# Patient Record
Sex: MALE | Race: BLACK OR AFRICAN AMERICAN | NOT HISPANIC OR LATINO | Employment: UNEMPLOYED | URBAN - NONMETROPOLITAN AREA
[De-identification: names, ages, dates, MRNs, and addresses within clinical notes are randomized per-mention and may not be internally consistent; named-entity substitution may affect disease eponyms.]

---

## 2018-08-04 ENCOUNTER — HOSPITAL ENCOUNTER (OUTPATIENT)
Facility: HOSPITAL | Age: 28
Setting detail: OBSERVATION
Discharge: HOME | End: 2018-08-05
Attending: EMERGENCY MEDICINE | Admitting: STUDENT IN AN ORGANIZED HEALTH CARE EDUCATION/TRAINING PROGRAM

## 2018-08-04 ENCOUNTER — APPOINTMENT (OUTPATIENT)
Dept: CT IMAGING | Facility: HOSPITAL | Age: 28
End: 2018-08-04

## 2018-08-04 DIAGNOSIS — M62.82 NON-TRAUMATIC RHABDOMYOLYSIS: Primary | ICD-10-CM

## 2018-08-04 PROBLEM — E86.0 DEHYDRATION: Status: ACTIVE | Noted: 2018-08-04

## 2018-08-04 PROBLEM — F17.210 DEPENDENCE ON NICOTINE FROM CIGARETTES: Status: ACTIVE | Noted: 2018-08-04

## 2018-08-04 LAB
ACETONE BLD QL: NEGATIVE
ALBUMIN SERPL-MCNC: 4.4 G/DL (ref 3.4–4.8)
ALBUMIN/GLOB SERPL: 1.5 G/DL (ref 1.1–1.8)
ALP SERPL-CCNC: 59 U/L (ref 38–126)
ALT SERPL W P-5'-P-CCNC: 39 U/L (ref 21–72)
ANION GAP SERPL CALCULATED.3IONS-SCNC: 7 MMOL/L (ref 5–15)
AST SERPL-CCNC: 68 U/L (ref 17–59)
BACTERIA UR QL AUTO: ABNORMAL /HPF
BASOPHILS # BLD AUTO: 0.02 10*3/MM3 (ref 0–0.2)
BASOPHILS NFR BLD AUTO: 0.2 % (ref 0–2)
BILIRUB SERPL-MCNC: 2.2 MG/DL (ref 0.2–1.3)
BILIRUB UR QL STRIP: ABNORMAL
BUN BLD-MCNC: 15 MG/DL (ref 7–21)
BUN/CREAT SERPL: 17.6 (ref 7–25)
CALCIUM SPEC-SCNC: 8.8 MG/DL (ref 8.4–10.2)
CHLORIDE SERPL-SCNC: 107 MMOL/L (ref 95–110)
CK MB SERPL-CCNC: 2.43 NG/ML (ref 0–5)
CK SERPL-CCNC: 1003 U/L (ref 55–170)
CK SERPL-CCNC: 837 U/L (ref 55–170)
CLARITY UR: CLEAR
CO2 SERPL-SCNC: 25 MMOL/L (ref 22–31)
COLOR UR: YELLOW
CREAT BLD-MCNC: 0.85 MG/DL (ref 0.7–1.3)
DEPRECATED RDW RBC AUTO: 41.2 FL (ref 35.1–43.9)
EOSINOPHIL # BLD AUTO: 0.13 10*3/MM3 (ref 0–0.7)
EOSINOPHIL NFR BLD AUTO: 1.4 % (ref 0–7)
ERYTHROCYTE [DISTWIDTH] IN BLOOD BY AUTOMATED COUNT: 12.3 % (ref 11.5–14.5)
GFR SERPL CREATININE-BSD FRML MDRD: 128 ML/MIN/1.73 (ref 70–162)
GLOBULIN UR ELPH-MCNC: 2.9 GM/DL (ref 2.3–3.5)
GLUCOSE BLD-MCNC: 123 MG/DL (ref 60–100)
GLUCOSE BLDC GLUCOMTR-MCNC: 133 MG/DL (ref 70–130)
GLUCOSE UR STRIP-MCNC: NEGATIVE MG/DL
HCT VFR BLD AUTO: 40.3 % (ref 39–49)
HGB BLD-MCNC: 14.2 G/DL (ref 13.7–17.3)
HGB UR QL STRIP.AUTO: NEGATIVE
HYALINE CASTS UR QL AUTO: ABNORMAL /LPF
IMM GRANULOCYTES # BLD: 0.02 10*3/MM3 (ref 0–0.02)
IMM GRANULOCYTES NFR BLD: 0.2 % (ref 0–0.5)
KETONES UR QL STRIP: NEGATIVE
LEUKOCYTE ESTERASE UR QL STRIP.AUTO: NEGATIVE
LIPASE SERPL-CCNC: 296 U/L (ref 23–300)
LYMPHOCYTES # BLD AUTO: 2.26 10*3/MM3 (ref 0.6–4.2)
LYMPHOCYTES NFR BLD AUTO: 24 % (ref 10–50)
MAGNESIUM SERPL-MCNC: 2.3 MG/DL (ref 1.6–2.3)
MCH RBC QN AUTO: 32.1 PG (ref 26.5–34)
MCHC RBC AUTO-ENTMCNC: 35.2 G/DL (ref 31.5–36.3)
MCV RBC AUTO: 91.2 FL (ref 80–98)
MONOCYTES # BLD AUTO: 0.8 10*3/MM3 (ref 0–0.9)
MONOCYTES NFR BLD AUTO: 8.5 % (ref 0–12)
NEUTROPHILS # BLD AUTO: 6.2 10*3/MM3 (ref 2–8.6)
NEUTROPHILS NFR BLD AUTO: 65.7 % (ref 37–80)
NITRITE UR QL STRIP: NEGATIVE
PH UR STRIP.AUTO: 6.5 [PH] (ref 5–9)
PLATELET # BLD AUTO: 205 10*3/MM3 (ref 150–450)
PMV BLD AUTO: 9.9 FL (ref 8–12)
POTASSIUM BLD-SCNC: 3.7 MMOL/L (ref 3.5–5.1)
PROT SERPL-MCNC: 7.3 G/DL (ref 6.3–8.6)
PROT UR QL STRIP: ABNORMAL
RBC # BLD AUTO: 4.42 10*6/MM3 (ref 4.37–5.74)
RBC # UR: ABNORMAL /HPF
REF LAB TEST METHOD: ABNORMAL
SODIUM BLD-SCNC: 139 MMOL/L (ref 137–145)
SP GR UR STRIP: 1.03 (ref 1–1.03)
SQUAMOUS #/AREA URNS HPF: ABNORMAL /HPF
UROBILINOGEN UR QL STRIP: ABNORMAL
WBC NRBC COR # BLD: 9.43 10*3/MM3 (ref 3.2–9.8)
WBC UR QL AUTO: ABNORMAL /HPF

## 2018-08-04 PROCEDURE — G0378 HOSPITAL OBSERVATION PER HR: HCPCS

## 2018-08-04 PROCEDURE — 85025 COMPLETE CBC W/AUTO DIFF WBC: CPT | Performed by: PHYSICIAN ASSISTANT

## 2018-08-04 PROCEDURE — 81001 URINALYSIS AUTO W/SCOPE: CPT | Performed by: PHYSICIAN ASSISTANT

## 2018-08-04 PROCEDURE — 83690 ASSAY OF LIPASE: CPT | Performed by: PHYSICIAN ASSISTANT

## 2018-08-04 PROCEDURE — 70450 CT HEAD/BRAIN W/O DYE: CPT

## 2018-08-04 PROCEDURE — 83735 ASSAY OF MAGNESIUM: CPT | Performed by: PHYSICIAN ASSISTANT

## 2018-08-04 PROCEDURE — 96361 HYDRATE IV INFUSION ADD-ON: CPT

## 2018-08-04 PROCEDURE — 80053 COMPREHEN METABOLIC PANEL: CPT | Performed by: PHYSICIAN ASSISTANT

## 2018-08-04 PROCEDURE — 25010000002 ONDANSETRON PER 1 MG: Performed by: PHYSICIAN ASSISTANT

## 2018-08-04 PROCEDURE — 82009 KETONE BODYS QUAL: CPT | Performed by: PHYSICIAN ASSISTANT

## 2018-08-04 PROCEDURE — 99284 EMERGENCY DEPT VISIT MOD MDM: CPT

## 2018-08-04 PROCEDURE — 93005 ELECTROCARDIOGRAM TRACING: CPT | Performed by: PHYSICIAN ASSISTANT

## 2018-08-04 PROCEDURE — 82962 GLUCOSE BLOOD TEST: CPT

## 2018-08-04 PROCEDURE — 82553 CREATINE MB FRACTION: CPT | Performed by: PHYSICIAN ASSISTANT

## 2018-08-04 PROCEDURE — 82550 ASSAY OF CK (CPK): CPT | Performed by: STUDENT IN AN ORGANIZED HEALTH CARE EDUCATION/TRAINING PROGRAM

## 2018-08-04 PROCEDURE — 93010 ELECTROCARDIOGRAM REPORT: CPT | Performed by: INTERNAL MEDICINE

## 2018-08-04 PROCEDURE — 82550 ASSAY OF CK (CPK): CPT | Performed by: PHYSICIAN ASSISTANT

## 2018-08-04 PROCEDURE — 93005 ELECTROCARDIOGRAM TRACING: CPT | Performed by: EMERGENCY MEDICINE

## 2018-08-04 PROCEDURE — 96374 THER/PROPH/DIAG INJ IV PUSH: CPT

## 2018-08-04 RX ORDER — SODIUM CHLORIDE 0.9 % (FLUSH) 0.9 %
1-10 SYRINGE (ML) INJECTION AS NEEDED
Status: DISCONTINUED | OUTPATIENT
Start: 2018-08-04 | End: 2018-08-05 | Stop reason: HOSPADM

## 2018-08-04 RX ORDER — ONDANSETRON 2 MG/ML
4 INJECTION INTRAMUSCULAR; INTRAVENOUS EVERY 6 HOURS PRN
Status: DISCONTINUED | OUTPATIENT
Start: 2018-08-04 | End: 2018-08-05 | Stop reason: HOSPADM

## 2018-08-04 RX ORDER — NICOTINE 21 MG/24HR
1 PATCH, TRANSDERMAL 24 HOURS TRANSDERMAL EVERY 24 HOURS
Status: DISCONTINUED | OUTPATIENT
Start: 2018-08-04 | End: 2018-08-05 | Stop reason: HOSPADM

## 2018-08-04 RX ORDER — HYDROXYZINE PAMOATE 25 MG/1
50 CAPSULE ORAL 3 TIMES DAILY PRN
Status: DISCONTINUED | OUTPATIENT
Start: 2018-08-04 | End: 2018-08-05 | Stop reason: HOSPADM

## 2018-08-04 RX ORDER — ONDANSETRON 2 MG/ML
4 INJECTION INTRAMUSCULAR; INTRAVENOUS ONCE
Status: COMPLETED | OUTPATIENT
Start: 2018-08-04 | End: 2018-08-04

## 2018-08-04 RX ORDER — SODIUM CHLORIDE 0.9 % (FLUSH) 0.9 %
10 SYRINGE (ML) INJECTION AS NEEDED
Status: DISCONTINUED | OUTPATIENT
Start: 2018-08-04 | End: 2018-08-05 | Stop reason: HOSPADM

## 2018-08-04 RX ORDER — SODIUM CHLORIDE 9 MG/ML
200 INJECTION, SOLUTION INTRAVENOUS CONTINUOUS
Status: DISCONTINUED | OUTPATIENT
Start: 2018-08-04 | End: 2018-08-05 | Stop reason: HOSPADM

## 2018-08-04 RX ORDER — ACETAMINOPHEN 325 MG/1
650 TABLET ORAL EVERY 4 HOURS PRN
Status: DISCONTINUED | OUTPATIENT
Start: 2018-08-04 | End: 2018-08-05 | Stop reason: HOSPADM

## 2018-08-04 RX ADMIN — SODIUM CHLORIDE 1000 ML: 9 INJECTION, SOLUTION INTRAVENOUS at 12:38

## 2018-08-04 RX ADMIN — SODIUM CHLORIDE 1000 ML: 9 INJECTION, SOLUTION INTRAVENOUS at 14:50

## 2018-08-04 RX ADMIN — ONDANSETRON HYDROCHLORIDE 4 MG: 2 INJECTION, SOLUTION INTRAMUSCULAR; INTRAVENOUS at 12:38

## 2018-08-04 RX ADMIN — SODIUM CHLORIDE 200 ML/HR: 9 INJECTION, SOLUTION INTRAVENOUS at 19:00

## 2018-08-05 VITALS
SYSTOLIC BLOOD PRESSURE: 149 MMHG | BODY MASS INDEX: 25.45 KG/M2 | TEMPERATURE: 97.5 F | OXYGEN SATURATION: 99 % | WEIGHT: 220 LBS | HEIGHT: 78 IN | RESPIRATION RATE: 18 BRPM | HEART RATE: 61 BPM | DIASTOLIC BLOOD PRESSURE: 68 MMHG

## 2018-08-05 PROBLEM — M62.82 NON-TRAUMATIC RHABDOMYOLYSIS: Status: RESOLVED | Noted: 2018-08-04 | Resolved: 2018-08-05

## 2018-08-05 PROBLEM — E86.0 DEHYDRATION: Status: RESOLVED | Noted: 2018-08-04 | Resolved: 2018-08-05

## 2018-08-05 LAB
ALBUMIN SERPL-MCNC: 3.9 G/DL (ref 3.4–4.8)
ALBUMIN/GLOB SERPL: 1.4 G/DL (ref 1.1–1.8)
ALP SERPL-CCNC: 56 U/L (ref 38–126)
ALT SERPL W P-5'-P-CCNC: 42 U/L (ref 21–72)
ANION GAP SERPL CALCULATED.3IONS-SCNC: 5 MMOL/L (ref 5–15)
AST SERPL-CCNC: 61 U/L (ref 17–59)
BASOPHILS # BLD AUTO: 0.03 10*3/MM3 (ref 0–0.2)
BASOPHILS NFR BLD AUTO: 0.5 % (ref 0–2)
BILIRUB SERPL-MCNC: 1.7 MG/DL (ref 0.2–1.3)
BILIRUB UR QL STRIP: NEGATIVE
BUN BLD-MCNC: 11 MG/DL (ref 7–21)
BUN/CREAT SERPL: 12.6 (ref 7–25)
CALCIUM SPEC-SCNC: 8.4 MG/DL (ref 8.4–10.2)
CHLORIDE SERPL-SCNC: 111 MMOL/L (ref 95–110)
CK SERPL-CCNC: 647 U/L (ref 55–170)
CK SERPL-CCNC: 698 U/L (ref 55–170)
CK SERPL-CCNC: 721 U/L (ref 55–170)
CLARITY UR: CLEAR
CO2 SERPL-SCNC: 26 MMOL/L (ref 22–31)
COLOR UR: YELLOW
CREAT BLD-MCNC: 0.87 MG/DL (ref 0.7–1.3)
DEPRECATED RDW RBC AUTO: 41.9 FL (ref 35.1–43.9)
EOSINOPHIL # BLD AUTO: 0.2 10*3/MM3 (ref 0–0.7)
EOSINOPHIL NFR BLD AUTO: 3.3 % (ref 0–7)
ERYTHROCYTE [DISTWIDTH] IN BLOOD BY AUTOMATED COUNT: 12.3 % (ref 11.5–14.5)
GFR SERPL CREATININE-BSD FRML MDRD: 125 ML/MIN/1.73 (ref 70–162)
GLOBULIN UR ELPH-MCNC: 2.8 GM/DL (ref 2.3–3.5)
GLUCOSE BLD-MCNC: 99 MG/DL (ref 60–100)
GLUCOSE UR STRIP-MCNC: NEGATIVE MG/DL
HCT VFR BLD AUTO: 40.1 % (ref 39–49)
HGB BLD-MCNC: 13.7 G/DL (ref 13.7–17.3)
HGB UR QL STRIP.AUTO: NEGATIVE
IMM GRANULOCYTES # BLD: 0.01 10*3/MM3 (ref 0–0.02)
IMM GRANULOCYTES NFR BLD: 0.2 % (ref 0–0.5)
KETONES UR QL STRIP: NEGATIVE
LEUKOCYTE ESTERASE UR QL STRIP.AUTO: NEGATIVE
LYMPHOCYTES # BLD AUTO: 2.2 10*3/MM3 (ref 0.6–4.2)
LYMPHOCYTES NFR BLD AUTO: 36.5 % (ref 10–50)
MCH RBC QN AUTO: 31.5 PG (ref 26.5–34)
MCHC RBC AUTO-ENTMCNC: 34.2 G/DL (ref 31.5–36.3)
MCV RBC AUTO: 92.2 FL (ref 80–98)
MONOCYTES # BLD AUTO: 0.62 10*3/MM3 (ref 0–0.9)
MONOCYTES NFR BLD AUTO: 10.3 % (ref 0–12)
NEUTROPHILS # BLD AUTO: 2.97 10*3/MM3 (ref 2–8.6)
NEUTROPHILS NFR BLD AUTO: 49.2 % (ref 37–80)
NITRITE UR QL STRIP: NEGATIVE
PH UR STRIP.AUTO: 7 [PH] (ref 5–9)
PLATELET # BLD AUTO: 181 10*3/MM3 (ref 150–450)
PMV BLD AUTO: 10.3 FL (ref 8–12)
POTASSIUM BLD-SCNC: 4.6 MMOL/L (ref 3.5–5.1)
PROT SERPL-MCNC: 6.7 G/DL (ref 6.3–8.6)
PROT UR QL STRIP: NEGATIVE
RBC # BLD AUTO: 4.35 10*6/MM3 (ref 4.37–5.74)
SODIUM BLD-SCNC: 142 MMOL/L (ref 137–145)
SP GR UR STRIP: 1.01 (ref 1–1.03)
UROBILINOGEN UR QL STRIP: NORMAL
WBC NRBC COR # BLD: 6.03 10*3/MM3 (ref 3.2–9.8)

## 2018-08-05 PROCEDURE — 82550 ASSAY OF CK (CPK): CPT | Performed by: STUDENT IN AN ORGANIZED HEALTH CARE EDUCATION/TRAINING PROGRAM

## 2018-08-05 PROCEDURE — 85025 COMPLETE CBC W/AUTO DIFF WBC: CPT | Performed by: STUDENT IN AN ORGANIZED HEALTH CARE EDUCATION/TRAINING PROGRAM

## 2018-08-05 PROCEDURE — 80053 COMPREHEN METABOLIC PANEL: CPT | Performed by: STUDENT IN AN ORGANIZED HEALTH CARE EDUCATION/TRAINING PROGRAM

## 2018-08-05 PROCEDURE — 81003 URINALYSIS AUTO W/O SCOPE: CPT | Performed by: STUDENT IN AN ORGANIZED HEALTH CARE EDUCATION/TRAINING PROGRAM

## 2018-08-05 PROCEDURE — G0378 HOSPITAL OBSERVATION PER HR: HCPCS

## 2018-08-05 PROCEDURE — 99222 1ST HOSP IP/OBS MODERATE 55: CPT | Performed by: STUDENT IN AN ORGANIZED HEALTH CARE EDUCATION/TRAINING PROGRAM

## 2018-08-05 PROCEDURE — 96361 HYDRATE IV INFUSION ADD-ON: CPT

## 2018-08-05 RX ADMIN — SODIUM CHLORIDE 200 ML/HR: 9 INJECTION, SOLUTION INTRAVENOUS at 00:24

## 2018-08-05 RX ADMIN — SODIUM CHLORIDE 200 ML/HR: 9 INJECTION, SOLUTION INTRAVENOUS at 06:59

## 2018-08-06 PROBLEM — M62.82 NON-TRAUMATIC RHABDOMYOLYSIS: Status: ACTIVE | Noted: 2018-08-06

## 2020-08-20 DIAGNOSIS — A59.9 TRICHIMONIASIS: Primary | ICD-10-CM

## 2020-08-20 RX ORDER — METRONIDAZOLE 500 MG/1
2000 TABLET ORAL ONCE
Qty: 4 TABLET | Refills: 0 | Status: SHIPPED | OUTPATIENT
Start: 2020-08-20 | End: 2020-08-20

## 2021-10-05 ENCOUNTER — APPOINTMENT (OUTPATIENT)
Dept: GENERAL RADIOLOGY | Facility: HOSPITAL | Age: 31
End: 2021-10-05

## 2021-10-05 ENCOUNTER — HOSPITAL ENCOUNTER (EMERGENCY)
Facility: HOSPITAL | Age: 31
Discharge: HOME | End: 2021-10-05
Attending: EMERGENCY MEDICINE | Admitting: EMERGENCY MEDICINE

## 2021-10-05 VITALS
SYSTOLIC BLOOD PRESSURE: 142 MMHG | HEART RATE: 91 BPM | OXYGEN SATURATION: 99 % | TEMPERATURE: 97.8 F | HEIGHT: 78 IN | DIASTOLIC BLOOD PRESSURE: 86 MMHG | RESPIRATION RATE: 18 BRPM | BODY MASS INDEX: 16.2 KG/M2 | WEIGHT: 140 LBS

## 2021-10-05 DIAGNOSIS — S53.115A ANTERIOR DISLOCATION OF LEFT ELBOW, INITIAL ENCOUNTER: Primary | ICD-10-CM

## 2021-10-05 PROBLEM — S53.105A CLOSED DISLOCATION OF LEFT ELBOW: Status: ACTIVE | Noted: 2021-10-05

## 2021-10-05 PROCEDURE — 99156 MOD SED OTH PHYS/QHP 5/>YRS: CPT

## 2021-10-05 PROCEDURE — 25010000002 PROCHLORPERAZINE 10 MG/2ML SOLUTION: Performed by: EMERGENCY MEDICINE

## 2021-10-05 PROCEDURE — 96375 TX/PRO/DX INJ NEW DRUG ADDON: CPT

## 2021-10-05 PROCEDURE — 99284 EMERGENCY DEPT VISIT MOD MDM: CPT

## 2021-10-05 PROCEDURE — 99203 OFFICE O/P NEW LOW 30 MIN: CPT | Performed by: ORTHOPAEDIC SURGERY

## 2021-10-05 PROCEDURE — 25010000002 LORAZEPAM PER 2 MG

## 2021-10-05 PROCEDURE — 73080 X-RAY EXAM OF ELBOW: CPT

## 2021-10-05 PROCEDURE — 25010000002 KETOROLAC TROMETHAMINE PER 15 MG: Performed by: EMERGENCY MEDICINE

## 2021-10-05 PROCEDURE — 73070 X-RAY EXAM OF ELBOW: CPT

## 2021-10-05 PROCEDURE — 99157 MOD SED OTHER PHYS/QHP EA: CPT

## 2021-10-05 PROCEDURE — 99153 MOD SED SAME PHYS/QHP EA: CPT

## 2021-10-05 PROCEDURE — 25010000002 ONDANSETRON PER 1 MG: Performed by: EMERGENCY MEDICINE

## 2021-10-05 PROCEDURE — 24600 TX CLSD ELBOW DISLC W/O ANES: CPT | Performed by: ORTHOPAEDIC SURGERY

## 2021-10-05 PROCEDURE — 96376 TX/PRO/DX INJ SAME DRUG ADON: CPT

## 2021-10-05 PROCEDURE — 96374 THER/PROPH/DIAG INJ IV PUSH: CPT

## 2021-10-05 PROCEDURE — 99152 MOD SED SAME PHYS/QHP 5/>YRS: CPT

## 2021-10-05 RX ORDER — LORAZEPAM 2 MG/ML
INJECTION INTRAMUSCULAR
Status: COMPLETED
Start: 2021-10-05 | End: 2021-10-05

## 2021-10-05 RX ORDER — KETAMINE HCL IN NACL, ISO-OSM 100MG/10ML
60 SYRINGE (ML) INJECTION ONCE
Status: COMPLETED | OUTPATIENT
Start: 2021-10-05 | End: 2021-10-05

## 2021-10-05 RX ORDER — PROCHLORPERAZINE EDISYLATE 5 MG/ML
5 INJECTION INTRAMUSCULAR; INTRAVENOUS ONCE
Status: COMPLETED | OUTPATIENT
Start: 2021-10-05 | End: 2021-10-05

## 2021-10-05 RX ORDER — ONDANSETRON 2 MG/ML
4 INJECTION INTRAMUSCULAR; INTRAVENOUS ONCE
Status: COMPLETED | OUTPATIENT
Start: 2021-10-05 | End: 2021-10-05

## 2021-10-05 RX ORDER — SODIUM CHLORIDE 0.9 % (FLUSH) 0.9 %
10 SYRINGE (ML) INJECTION AS NEEDED
Status: DISCONTINUED | OUTPATIENT
Start: 2021-10-05 | End: 2021-10-05 | Stop reason: HOSPADM

## 2021-10-05 RX ORDER — LORAZEPAM 2 MG/ML
1 INJECTION INTRAMUSCULAR ONCE
Status: COMPLETED | OUTPATIENT
Start: 2021-10-05 | End: 2021-10-05

## 2021-10-05 RX ORDER — KETOROLAC TROMETHAMINE 30 MG/ML
30 INJECTION, SOLUTION INTRAMUSCULAR; INTRAVENOUS ONCE
Status: COMPLETED | OUTPATIENT
Start: 2021-10-05 | End: 2021-10-05

## 2021-10-05 RX ORDER — HYDROCODONE BITARTRATE AND ACETAMINOPHEN 5; 325 MG/1; MG/1
1 TABLET ORAL EVERY 6 HOURS PRN
Qty: 12 TABLET | Refills: 0 | Status: SHIPPED | OUTPATIENT
Start: 2021-10-05 | End: 2021-10-12 | Stop reason: SDUPTHER

## 2021-10-05 RX ADMIN — Medication 60 MG: at 04:49

## 2021-10-05 RX ADMIN — ONDANSETRON 4 MG: 2 INJECTION INTRAMUSCULAR; INTRAVENOUS at 03:59

## 2021-10-05 RX ADMIN — LORAZEPAM 1 MG: 2 INJECTION INTRAMUSCULAR; INTRAVENOUS at 04:00

## 2021-10-05 RX ADMIN — ONDANSETRON 4 MG: 2 INJECTION INTRAMUSCULAR; INTRAVENOUS at 04:48

## 2021-10-05 RX ADMIN — Medication 60 MG: at 04:13

## 2021-10-05 RX ADMIN — PROCHLORPERAZINE EDISYLATE 5 MG: 5 INJECTION INTRAMUSCULAR; INTRAVENOUS at 05:08

## 2021-10-05 RX ADMIN — LORAZEPAM 1 MG: 2 INJECTION INTRAMUSCULAR at 04:00

## 2021-10-05 RX ADMIN — KETOROLAC TROMETHAMINE 30 MG: 30 INJECTION, SOLUTION INTRAMUSCULAR; INTRAVENOUS at 03:59

## 2021-10-05 RX ADMIN — SODIUM CHLORIDE 1000 ML: 9 INJECTION, SOLUTION INTRAVENOUS at 03:59

## 2021-10-05 NOTE — SEDATION DOCUMENTATION
RT at bedside, all supplies at bedside. crash cart, ortho cart, suction set up, pt medicated and on cardiac monitor
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
## 2021-10-05 NOTE — ED NOTES
Pt left with his brother that was present at discharge teaching     Yuridia Pierre, RN  10/05/21 0947

## 2021-10-05 NOTE — ED NOTES
Pt arouseable , does not want to stay awake, vss, pt still on cardiac monitor and oxygen.  Pt not ready to be discharged at this time, provider aware     Leticia Mckenna RN  10/05/21 0572

## 2021-10-05 NOTE — ED NOTES
Pt place don oxygen  Prior to procedure, 3 liter nasal canula     Leticia Mckenna, RN  10/05/21 0426

## 2021-10-05 NOTE — ED NOTES
Carlotta RN watched waste of ketamine, med dispense did not show waste to record     Leticia Mckenna RN  10/05/21 0560

## 2021-10-05 NOTE — ED NOTES
Pts wife called for an update on pt. Stated she would be his ride home if he needed ride. Tracee [phone]     Yuridia Pierre RN  10/05/21 1315

## 2021-10-11 DIAGNOSIS — S53.105A CLOSED DISLOCATION OF LEFT ELBOW, INITIAL ENCOUNTER: ICD-10-CM

## 2021-10-11 DIAGNOSIS — M25.522 LEFT ELBOW PAIN: Primary | ICD-10-CM

## 2021-10-12 ENCOUNTER — OFFICE VISIT (OUTPATIENT)
Dept: ORTHOPEDIC SURGERY | Facility: CLINIC | Age: 31
End: 2021-10-12

## 2021-10-12 VITALS
DIASTOLIC BLOOD PRESSURE: 82 MMHG | SYSTOLIC BLOOD PRESSURE: 140 MMHG | BODY MASS INDEX: 25.3 KG/M2 | HEIGHT: 78 IN | WEIGHT: 218.7 LBS

## 2021-10-12 DIAGNOSIS — S53.105D CLOSED DISLOCATION OF LEFT ELBOW, SUBSEQUENT ENCOUNTER: Primary | ICD-10-CM

## 2021-10-12 DIAGNOSIS — S53.115A ANTERIOR DISLOCATION OF LEFT ELBOW, INITIAL ENCOUNTER: ICD-10-CM

## 2021-10-12 DIAGNOSIS — M25.522 LEFT ELBOW PAIN: ICD-10-CM

## 2021-10-12 PROCEDURE — 99024 POSTOP FOLLOW-UP VISIT: CPT | Performed by: ORTHOPAEDIC SURGERY

## 2021-10-12 RX ORDER — HYDROCODONE BITARTRATE AND ACETAMINOPHEN 5; 325 MG/1; MG/1
1 TABLET ORAL EVERY 8 HOURS PRN
Qty: 20 TABLET | Refills: 0 | OUTPATIENT
Start: 2021-10-12 | End: 2022-12-08

## 2021-10-12 NOTE — PROGRESS NOTES
Shamir Villa is a 31 y.o. male   Primary provider:  Provider, No Known       Chief Complaint   Patient presents with   • Left Elbow - Initial Evaluation       HISTORY OF PRESENT ILLNESS:  Patient in for initial eval of left elbow injury.   Xray completed upon arrival.   He continues to have some pain in his elbow but pain is much better after being reduced and splinted.  No numbness or tingling.  No fevers or chills.    Arm Injury   The incident occurred more than 1 week ago. The incident occurred at home. The injury mechanism was a fall (tripped and fell while moving). The pain is present in the left elbow. The quality of the pain is described as aching, burning and stabbing. The pain is severe. The pain has been intermittent since the incident. Associated symptoms comments: Swelling/clicking/popping/snapping. Exacerbated by: driving.        CONCURRENT MEDICAL HISTORY:    Past Medical History:   Diagnosis Date   • Acute bronchitis    • Anorectal abscess    • Chest pain    • Diarrhea    • Diarrhea, unspecified    • Dysuria     Dysuria - Suspect STI      • Fatigue     Fatigue - symptom      • Gastroenteritis    • GERD (gastroesophageal reflux disease)    • Lymphedema     Facial lymphedema - parotitis      • Nausea with vomiting, unspecified    • Renal and perinephric abscess     Renal and perinephric abscess - perianal      • Tobacco dependence syndrome    • Upper respiratory infection        No Known Allergies      Current Outpatient Medications:   •  HYDROcodone-acetaminophen (NORCO) 5-325 MG per tablet, Take 1 tablet by mouth Every 8 (Eight) Hours As Needed for Severe Pain ., Disp: 20 tablet, Rfl: 0    Past Surgical History:   Procedure Laterality Date   • INCISION AND DRAINAGE ABSCESS  08/18/2011     I & D of a right ischiorectal - perirectal abscess.        Family History   Problem Relation Age of Onset   • Diabetes Other    • Heart disease Other    • Hypertension Other    • Stroke Other    • Kidney disease Other  "        Social History     Socioeconomic History   • Marital status:    Tobacco Use   • Smoking status: Current Every Day Smoker     Types: Cigarettes   • Smokeless tobacco: Never Used   Substance and Sexual Activity   • Alcohol use: No   • Drug use: No   • Sexual activity: Defer        Review of Systems   Constitutional: Negative.    HENT: Negative.    Eyes: Negative.    Respiratory: Negative.    Cardiovascular: Negative.    Gastrointestinal: Negative.    Endocrine: Negative.    Genitourinary: Negative.    Musculoskeletal:        Left elbow pain   Skin: Negative.    Allergic/Immunologic: Negative.    Neurological: Negative.    Hematological: Negative.    Psychiatric/Behavioral: Negative.        PHYSICAL EXAMINATION:       /82   Ht 200.7 cm (79\")   Wt 99.2 kg (218 lb 11.2 oz)   BMI 24.64 kg/m²     Physical Exam  Constitutional:       General: He is not in acute distress.     Appearance: Normal appearance.   Pulmonary:      Effort: Pulmonary effort is normal. No respiratory distress.   Neurological:      Mental Status: He is alert and oriented to person, place, and time.         GAIT:     [x]  Normal  []  Antalgic    Assistive device: [x]  None  []  Walker     []  Crutches  []  Cane     []  Wheelchair  []  Stretcher    Left Elbow Exam     Comments:  Good finger motion.  Mild swelling in the fingers.  Good capillary refill.  Mild diffuse tenderness around the elbow.              XR ELBOW 2 VIEW LEFT    Result Date: 10/5/2021  Narrative: EXAM DESCRIPTION: XR ELBOW 2 VW LEFT RadLex: XR ELBOW 1-2 VIEWS Technique:  2 views CLINICAL HISTORY: Post reduction Lt elbow, S53.115A Anterior dislocation of left ulnohumeral joint, initial encounter. COMPARISON: Earlier elbow x-rays same day. FINDINGS: There has been interval reduction of previous elbow dislocation. No acute fracture is seen.     Impression: 1.  Interval reduction of previous elbow dislocation. Electronically signed by:  Micah Kang DO  10/5/2021 5:33 " AM CDT Workstation: [id number]    XR ELBOW 2 VIEW LEFT    Result Date: 10/5/2021  Narrative: CLINICAL HISTORY:s/p attempted reduction, S53.115A Anterior dislocation of left ulnohumeral joint, initial encounter XR ELBOW 1-2 VIEWS left Comparison: October 5, 2021 at 2:25 AM Findings: There is posterior dislocation of the elbow relation to the humerus. There is also volar and lateral angulation forearm in relation to the humerus. There is soft tissue swelling of the elbow is well as subcutaneous edema and joint effusion. No radiopaque foreign body.     Impression: Impression: 1.  Posterior dislocation of the elbow. 2.  Soft tissue swelling and joint effusion. Electronically signed by:  Donna Magana DO  10/5/2021 5:06 AM CDT Workstation: [id number]    XR Elbow 3+ View Left    Result Date: 10/12/2021  Narrative: Ordering Provider:  Brandon Hidalgo MD Ordering Diagnosis/Indication:  Left elbow pain, Closed dislocation of left elbow, initial encounter Procedure:  XR ELBOW 3+ VW LEFT Exam Date:  10/12/21 COMPARISON:  Todays X-rays were compared to previous images dated October 5, 2021.     Impression:  4 views of the left elbow show acceptable position alignment of the elbow with no evidence of acute bony abnormality.  No fracture or dislocation is noted.  No interval change noted in comparison to prior x-ray. Brandon Hidalgo MD 10/12/21     XR Elbow 3+ View Left    Result Date: 10/5/2021  Narrative: EXAM DESCRIPTION: XR ELBOW 3+ VW LEFT RadLex: XR ELBOW 3 VIEWS Technique:  3 views of left elbow CLINICAL HISTORY: Left elbow injury. COMPARISON: None. FINDINGS: The proximal ulna appears dislocated anteriorly in relation to the trochlea. The radial head appears to remain articulating with the capitellum. No acute fracture is identified. There is soft tissue injury/edema along the medial elbow.     Impression: Proximal ulna appears to be dislocated anteriorly in relation to the trochlea. Electronically  signed by:  Micah Kang DO  10/5/2021 3:04 AM CDT Workstation: [id number]          ASSESSMENT:    Diagnoses and all orders for this visit:    Closed dislocation of left elbow, subsequent encounter  -     Ambulatory Referral to Physical Therapy    Left elbow pain  -     Ambulatory Referral to Physical Therapy          PLAN    He is almost 2 weeks out from a elbow dislocation.  Patient was reduced in the emergency department.  Plan is to begin controlled, slow, progressive range of motion.  We discussed using an elbow brace for support to limit full extension and to improve stability during the motion process.  Continue ice and elevation.    PT:   Gentle motion left elbow.   Avoid full extension for another couple weeks   Probably needs hinged elbow brace locked for 1st week, slowly  progressing with extension lock but limited to -30 of extension for a  full 5 weeks after injury.   Continue teaching and gentle HEP    Recheck in 4 weeks with repeat xrays       Return in about 4 weeks (around 11/9/2021) for Recheck with repeat xrays.    Brandon Hidalgo MD

## 2021-10-12 NOTE — TELEPHONE ENCOUNTER
DR KIM.  PATIENT CALLED BACK STATING HE WAS IN THE OFFICE THIS MORNING AND DOES NEED A REFILL OF HYDROCODONE 5MG AT [facility].

## 2021-10-13 ENCOUNTER — HOSPITAL ENCOUNTER (OUTPATIENT)
Dept: PHYSICAL THERAPY | Facility: HOSPITAL | Age: 31
Setting detail: THERAPIES SERIES
Discharge: HOME | End: 2021-10-13

## 2021-10-13 DIAGNOSIS — M25.522 LEFT ELBOW PAIN: Primary | ICD-10-CM

## 2021-10-13 DIAGNOSIS — S53.105A CLOSED DISLOCATION OF LEFT ELBOW, INITIAL ENCOUNTER: ICD-10-CM

## 2021-10-13 PROCEDURE — 97162 PT EVAL MOD COMPLEX 30 MIN: CPT | Performed by: PHYSICAL THERAPIST

## 2021-10-13 NOTE — THERAPY EVALUATION
Outpatient Physical Therapy Ortho Initial Evaluation  [facility]     Patient Name: Shamir Villa  : 1990  MRN: [id number]  Today's Date: 10/13/2021      Visit Date: 10/13/2021    Attendance:  ()  Subjective Improvement: n/a  Next MD Appt: 21  Recert Date: 11/3/21    Therapy Diagnosis: L elbow dislocation 10/5/21       Past Medical History:   Diagnosis Date   • Acute bronchitis    • Anorectal abscess    • Chest pain    • Diarrhea    • Diarrhea, unspecified    • Dysuria     Dysuria - Suspect STI      • Fatigue     Fatigue - symptom      • Gastroenteritis    • GERD (gastroesophageal reflux disease)    • Lymphedema     Facial lymphedema - parotitis      • Nausea with vomiting, unspecified    • Renal and perinephric abscess     Renal and perinephric abscess - perianal      • Tobacco dependence syndrome    • Upper respiratory infection         Past Surgical History:   Procedure Laterality Date   • INCISION AND DRAINAGE ABSCESS  2011     I & D of a right ischiorectal - perirectal abscess.        Visit Dx:     ICD-10-CM ICD-9-CM   1. Left elbow pain  M25.522 719.42   2. Closed dislocation of left elbow, initial encounter  S53.105A 832.00          Patient History     Row Name 10/13/21 1000             History    Chief Complaint Difficulty with daily activities; Pain  -SS      Type of Pain Elbow pain  left  -SS      Date Current Problem(s) Began 10/05/21  -SS      Brief Description of Current Complaint Patient dislocated his left when he fell while chasing his daughter around. States that he tried to catch himself against the wall. He was transported to [facility] ED by ambulance. His elbow dislocation was reduced in the ED by Dr. Hidalgo. He was placed in a sugar tong splint by Dr. Hidalgo. Followed up with Dr. Hidalgo yesterday. Dr. Hidalgo refers him to P.T. to start rehabilitation. Patient notes pain if he is extending his elbow, positioning during x-ray yesterday, and reaching his arm around.   "male with children.  -SS      Patient/Caregiver Goals Return to prior level of function  -SS      Patient/Caregiver Goals Comment \"Get my arm back to normal.\"  -SS      Current Tobacco Use cigarette smoker  -SS      Smoking Status 0.5 ppd  -SS      Hand Dominance right-handed  -SS      Occupation/sports/leisure activities Unemployed. Hobbies: children's activities  -SS      What clinical tests have you had for this problem? X-ray  -SS      Results of Clinical Tests acceptable position and alignment of L elbow per Radiology report from most recent x-ray  -SS              Pain     Pain Location Arm; Elbow  left  -SS      Pain at Present 1  -SS      Pain at Best 0  -SS      Pain at Worst 9  -SS      Pain Description Burning  \"starts off as a series of shocks\"  -SS      What Performance Factors Make the Current Problem(s) WORSE? movement of the elbow, extending shoulder too far back  -SS      What Performance Factors Make the Current Problem(s) BETTER? brace and self-sling arm  -SS      Is your sleep disturbed? No  -SS      Is medication used to assist with sleep? No  -SS      Difficulties at work? n/a  -SS      Difficulties with ADL's? decreased use L UE  -SS      Difficulties with recreational activities? decreased  -SS              Fall Risk Assessment    Any falls in the past year: No  -SS      Number of falls reported in the last 12 months 1  -SS      Factors that contributed to the fall: Tripped  -SS      Does patient have a fear of falling No  -SS              Daily Activities    Primary Language English  -              Safety    Are you being hurt, hit, or frightened by anyone at home or in your life? No  -SS      Are you being neglected by a caregiver No  -SS      Have you had any of the following issues with N/A  -SS            User Key  (r) = Recorded By, (t) = Taken By, (c) = Cosigned By    Initials Name Provider Type    SS Ignacio Lew, PT DPT Physical Therapist                 PT Ortho     Kriss " "Name 10/13/21 1100       Subjective Comments    Subjective Comments see Therapy Patient History  -       Precautions and Contraindications    Precautions L elbow dislocation 10/5/21  -       Subjective Pain    Able to rate subjective pain? yes  -    Pre-Treatment Pain Level 1  -    Post-Treatment Pain Level 1  -       Posture/Observations    Posture/Observations Comments Presents this date wearing sugar tong and posterior elbow splint made at ED that his maintaining his elbow in flexed and partially supinated position.  -SS       Left Upper Ext    Lt Elbow Extension/Flexion AROM 0- deg; end-range pain each  -SS    Lt Elbow Extension/Flexion PROM 0- deg; pain/\"resistance\" limited  -SS    Lt Elbow Supination AROM 90 deg  -SS    Lt Elbow Pronation AROM 63 deg; \"a little\" pain  -       MMT (Manual Muscle Testing)    General MMT Comments deferred  -       Sensation    Additional Comments Sensation intact to light touch in L UE.  -          User Key  (r) = Recorded By, (t) = Taken By, (c) = Cosigned By    Initials Name Provider Type    SS Ignacio Lew, PT DPT Physical Therapist                  Therapy Education  Education Details: brace wear; elbow AROM in brace  Given: HEP, Symptoms/condition management  Program: New  How Provided: Verbal, Demonstration  Provided to: Patient  Level of Understanding: Verbalized, Demonstrated      PT OP Goals     Row Name 10/13/21 1000          PT Short Term Goals    STG Date to Achieve 11/03/21  -     STG 1 Note a >/= 25% subjective improvement.  -     STG 2 Complete QuickDASH.  -     STG 3 Active elbow flexion to be >/= 125 deg.  -     STG 4 Active elbow extension to be -30 deg.  -     STG 5 Active forearm pronation to be >/= 80 deg.  -            Long Term Goals    LTG Date to Achieve 11/24/21  -     LTG 1 Independent with HEP/self-management.  -     LTG 2 L elbow AROM WNLs.  -     LTG 3 QuickDASH score to be </= 20.  -     LTG " 4 Resume PLOF.  -            Time Calculation    PT Goal Re-Cert Due Date 11/03/21  -           User Key  (r) = Recorded By, (t) = Taken By, (c) = Cosigned By    Initials Name Provider Type     Ignacio Lew, PT DPT Physical Therapist                 PT Assessment/Plan     Row Name 10/13/21 1000          PT Assessment    Functional Limitations Limitation in home management; Limitations in community activities; Limitations in functional capacity and performance; Performance in leisure activities; Performance in self-care ADL  -     Impairments Joint mobility; Joint integrity; Pain; Range of motion  -     Assessment Comments Patient is 8 days s/p closed dislocation of left elbow. Patient was placed in brace as per MD instruction. Brace blocked to limit extension to no better than -30 deg. Patient instructed to lock brace between exercise sessions at this time.  -     Rehab Potential Good  -     Patient/caregiver participated in establishment of treatment plan and goals Yes  -SS     Patient would benefit from skilled therapy intervention Yes  -SS            PT Plan    PT Frequency 1x/week; 2x/week  -     Predicted Duration of Therapy Intervention (PT) 6-8 weeks  -     PT Plan Comments Gentle A/AA/PROM, ice with or without IFC estim as needed for pain. Extension in brace limited to -30 deg x 5 full weeks after injury per MD order. Unlock brace between therapy sessions starting next week. No full extension until 3-4 weeks post-injury.  -           User Key  (r) = Recorded By, (t) = Taken By, (c) = Cosigned By    Initials Name Provider Type    Ignacio Lyles, PT DPT Physical Therapist                   OP Exercises     Row Name 10/13/21 1100             Subjective Comments    Subjective Comments see Therapy Patient History  -              Subjective Pain    Able to rate subjective pain? yes  -SS      Pre-Treatment Pain Level 1  -      Post-Treatment Pain Level 1  -               Exercise 1    Exercise Name 1 Patient fit with [employer] X-ACT Elbow brace as per MD orders  -SS            User Key  (r) = Recorded By, (t) = Taken By, (c) = Cosigned By    Initials Name Provider Type    SS Ignacio Lew, PT DPT Physical Therapist                     Time Calculation:     Start Time: 1052  Stop Time: 1141  Time Calculation (min): 49 min     Therapy Charges for Today     Code Description Service Date Service Provider Modifiers Qty    88875065382 HC PT EVAL MOD COMPLEXITY 3 10/13/2021 Ignacio Lew, PT DPT GP 1                    Ignacio Lew, PT, DPT, CHT  10/13/2021

## 2021-10-14 ENCOUNTER — PATIENT ROUNDING (BHMG ONLY) (OUTPATIENT)
Dept: ORTHOPEDIC SURGERY | Facility: CLINIC | Age: 31
End: 2021-10-14

## 2021-10-14 NOTE — PROGRESS NOTES
October 14, 2021    Hello, may I speak with Shamir Villa?    My name is Lori Winters, Clinical Coordinator      I am  with [facility]  [address] [place] [address] [phone].    Before we get started may I verify your date of birth? 1990    I am calling to officially welcome you to our practice and ask about your recent visit. Is this a good time to talk? Voicemail left    Tell me about your visit with us. What things went well?         We're always looking for ways to make our patients' experiences even better. Do you have recommendations on ways we may improve?      Overall were you satisfied with your first visit to our practice?        I appreciate you taking the time to speak with me today. Is there anything else I can do for you?           Thank you, and have a great day.

## 2021-10-22 ENCOUNTER — APPOINTMENT (OUTPATIENT)
Dept: PHYSICAL THERAPY | Facility: HOSPITAL | Age: 31
End: 2021-10-22

## 2021-11-02 ENCOUNTER — APPOINTMENT (OUTPATIENT)
Dept: PHYSICAL THERAPY | Facility: HOSPITAL | Age: 31
End: 2021-11-02

## 2021-11-08 DIAGNOSIS — M25.522 LEFT ELBOW PAIN: Primary | ICD-10-CM

## 2021-11-08 DIAGNOSIS — S53.105D CLOSED DISLOCATION OF LEFT ELBOW, SUBSEQUENT ENCOUNTER: ICD-10-CM
